# Patient Record
Sex: FEMALE | Race: BLACK OR AFRICAN AMERICAN | NOT HISPANIC OR LATINO | Employment: FULL TIME | URBAN - METROPOLITAN AREA
[De-identification: names, ages, dates, MRNs, and addresses within clinical notes are randomized per-mention and may not be internally consistent; named-entity substitution may affect disease eponyms.]

---

## 2023-11-07 ENCOUNTER — OFFICE VISIT (OUTPATIENT)
Dept: PRIMARY CARE | Facility: CLINIC | Age: 48
End: 2023-11-07
Payer: COMMERCIAL

## 2023-11-07 VITALS
BODY MASS INDEX: 21.63 KG/M2 | TEMPERATURE: 97.3 F | DIASTOLIC BLOOD PRESSURE: 70 MMHG | WEIGHT: 134 LBS | SYSTOLIC BLOOD PRESSURE: 110 MMHG

## 2023-11-07 DIAGNOSIS — R53.83 FATIGUE, UNSPECIFIED TYPE: ICD-10-CM

## 2023-11-07 DIAGNOSIS — D64.9 ANEMIA, UNSPECIFIED TYPE: Primary | ICD-10-CM

## 2023-11-07 DIAGNOSIS — I10 HYPERTENSION, UNSPECIFIED TYPE: ICD-10-CM

## 2023-11-07 DIAGNOSIS — R11.0 NAUSEA: ICD-10-CM

## 2023-11-07 DIAGNOSIS — Z12.31 SCREENING MAMMOGRAM FOR BREAST CANCER: ICD-10-CM

## 2023-11-07 DIAGNOSIS — R09.89 BILATERAL CAROTID BRUITS: ICD-10-CM

## 2023-11-07 DIAGNOSIS — E78.2 MIXED HYPERLIPIDEMIA: ICD-10-CM

## 2023-11-07 DIAGNOSIS — R42 VERTIGO: ICD-10-CM

## 2023-11-07 DIAGNOSIS — R01.1 HEART MURMUR: ICD-10-CM

## 2023-11-07 PROCEDURE — 80061 LIPID PANEL: CPT | Performed by: INTERNAL MEDICINE

## 2023-11-07 PROCEDURE — 3078F DIAST BP <80 MM HG: CPT | Performed by: INTERNAL MEDICINE

## 2023-11-07 PROCEDURE — 99214 OFFICE O/P EST MOD 30 MIN: CPT | Performed by: INTERNAL MEDICINE

## 2023-11-07 PROCEDURE — 1036F TOBACCO NON-USER: CPT | Performed by: INTERNAL MEDICINE

## 2023-11-07 PROCEDURE — 3074F SYST BP LT 130 MM HG: CPT | Performed by: INTERNAL MEDICINE

## 2023-11-07 PROCEDURE — 80053 COMPREHEN METABOLIC PANEL: CPT | Performed by: INTERNAL MEDICINE

## 2023-11-07 PROCEDURE — 85025 COMPLETE CBC W/AUTO DIFF WBC: CPT | Performed by: INTERNAL MEDICINE

## 2023-11-07 PROCEDURE — 84443 ASSAY THYROID STIM HORMONE: CPT | Performed by: INTERNAL MEDICINE

## 2023-11-07 RX ORDER — MECLIZINE HCL 25MG 25 MG/1
25 TABLET, CHEWABLE ORAL EVERY 8 HOURS PRN
Qty: 90 TABLET | Refills: 3 | Status: SHIPPED | OUTPATIENT
Start: 2023-11-07 | End: 2024-11-06

## 2023-11-07 RX ORDER — ONDANSETRON 4 MG/1
4 TABLET, ORALLY DISINTEGRATING ORAL EVERY 8 HOURS
Qty: 20 TABLET | Refills: 3 | Status: SHIPPED | OUTPATIENT
Start: 2023-11-07

## 2023-11-07 RX ORDER — AMLODIPINE BESYLATE 10 MG/1
10 TABLET ORAL DAILY
COMMUNITY
End: 2023-11-07 | Stop reason: SDUPTHER

## 2023-11-07 RX ORDER — AMLODIPINE BESYLATE 10 MG/1
10 TABLET ORAL DAILY
Qty: 90 TABLET | Refills: 3 | Status: SHIPPED | OUTPATIENT
Start: 2023-11-07

## 2023-11-07 RX ORDER — ONDANSETRON 4 MG/1
4 TABLET, ORALLY DISINTEGRATING ORAL EVERY 8 HOURS
COMMUNITY
Start: 2019-01-07 | End: 2023-11-07 | Stop reason: SDUPTHER

## 2023-11-07 RX ORDER — MECLIZINE HCL 25MG 25 MG/1
25 TABLET, CHEWABLE ORAL EVERY 8 HOURS PRN
COMMUNITY
Start: 2018-03-10 | End: 2023-11-07 | Stop reason: SDUPTHER

## 2023-11-07 RX ORDER — CLOCORTOLONE PIVALATE 0 G/G
CREAM TOPICAL
COMMUNITY
Start: 2023-06-19

## 2023-11-07 NOTE — PROGRESS NOTES
Subjective   Patient ID: Ana Fitzgerald is a 48 y.o. female who presents for Med Refill.    HPI   48 years old female comes in to see me because she needs a refill on her medications amlodipine 10 mg, meclizine and Zofran.  When she feels vertigo she feels nausea and she takes Zofran for that.  Her medication goes to [facility] medical height [id number].  She has no specific symptoms or complaint.  Review of Systems  12 system reviewed and 12 systems are negative.  She denies any chest pain shortness of breath cough chills fever nausea vomiting except when she has vertigo.  No GI  or neurological symptoms.  Objective   /70   Temp 36.3 °C (97.3 °F)   Wt 60.8 kg (134 lb)   BMI 21.63 kg/m²     Physical Exam  Alert oriented in no distress pleasant cooperative.  Nonicteric sclera or jaundice.  Face symmetrical cranial nerves intact.  Neck supple bilateral carotid bruits present.  No masses no organomegaly.  No thyromegaly.  Lungs clear no rales wheezing or crackles.  Heart normal S1 and S2 systolic ejection murmur present.  Abdomen benign nontender no masses no organomegaly ,  bowel sounds present.  No pain on palpations.  Neurological exam intact.  We agreed that she needs first of all the mammogram this year.  Agreed also to obtain ultrasounds on her carotids and to consult cardiology because of her murmur in the heart..  Her medications were refilled at [facility].  Assessment/Plan   Diagnoses and all orders for this visit:  Anemia, unspecified type  -     CBC  Hypertension, unspecified type  -     Comprehensive Metabolic Panel  -     amLODIPine (Norvasc) 10 mg tablet; Take 1 tablet (10 mg) by mouth once daily.  Mixed hyperlipidemia  -     Lipid Panel  Fatigue, unspecified type  -     Thyroid Stimulating Hormone  Screening mammogram for breast cancer  -     BI mammo bilateral screening tomosynthesis; Future  Bilateral carotid bruits  -     Vascular US carotid artery duplex bilateral; Future  Heart  murmur  -     Referral to Cardiology; Future  Nausea  -     ondansetron ODT (Zofran-ODT) 4 mg disintegrating tablet; Take 1 tablet (4 mg) by mouth every 8 hours.  Vertigo  -     meclizine (Antivert) 25 mg tablet,chewable; Chew 1 tablet (25 mg) every 8 hours if needed (vertigo).

## 2023-11-10 ENCOUNTER — TELEPHONE (OUTPATIENT)
Dept: PRIMARY CARE | Facility: CLINIC | Age: 48
End: 2023-11-10
Payer: COMMERCIAL

## 2023-11-10 DIAGNOSIS — D50.0 IRON DEFICIENCY ANEMIA DUE TO CHRONIC BLOOD LOSS: Primary | ICD-10-CM

## 2023-11-10 RX ORDER — FERROUS SULFATE 325(65) MG
65 TABLET, DELAYED RELEASE (ENTERIC COATED) ORAL
Qty: 270 TABLET | Refills: 3 | Status: SHIPPED | OUTPATIENT
Start: 2023-11-10 | End: 2023-11-17

## 2023-11-10 NOTE — TELEPHONE ENCOUNTER
I called the patient and instructed her to take iron supplement, she is anemic and her white count is diminished to 2.87 with anemia.  She needs iron supplement 3 times a day.  Prescription provided.  Consider also consulting hematologist in the near future thank you

## 2023-11-17 ENCOUNTER — TELEPHONE (OUTPATIENT)
Dept: PRIMARY CARE | Facility: CLINIC | Age: 48
End: 2023-11-17
Payer: COMMERCIAL

## 2023-11-17 DIAGNOSIS — D50.0 IRON DEFICIENCY ANEMIA DUE TO CHRONIC BLOOD LOSS: ICD-10-CM

## 2023-11-17 RX ORDER — FERROUS GLUCONATE 324(38)MG
38 TABLET ORAL
Qty: 90 TABLET | Refills: 3 | Status: SHIPPED | OUTPATIENT
Start: 2023-11-17 | End: 2024-11-16

## 2023-11-17 RX ORDER — FERROUS SULFATE 325(65) MG
65 TABLET, DELAYED RELEASE (ENTERIC COATED) ORAL
Qty: 270 TABLET | Refills: 3 | Status: CANCELLED | OUTPATIENT
Start: 2023-11-17 | End: 2024-11-16

## 2023-11-22 ENCOUNTER — HOSPITAL ENCOUNTER (OUTPATIENT)
Dept: VASCULAR MEDICINE | Facility: HOSPITAL | Age: 48
Discharge: HOME | End: 2023-11-22
Payer: COMMERCIAL

## 2023-11-22 ENCOUNTER — ANCILLARY PROCEDURE (OUTPATIENT)
Dept: RADIOLOGY | Facility: CLINIC | Age: 48
End: 2023-11-22
Payer: COMMERCIAL

## 2023-11-22 ENCOUNTER — TELEPHONE (OUTPATIENT)
Dept: PRIMARY CARE | Facility: CLINIC | Age: 48
End: 2023-11-22
Payer: COMMERCIAL

## 2023-11-22 DIAGNOSIS — R09.89 BILATERAL CAROTID BRUITS: ICD-10-CM

## 2023-11-22 DIAGNOSIS — Z12.31 SCREENING MAMMOGRAM FOR BREAST CANCER: ICD-10-CM

## 2023-11-22 PROCEDURE — 76642 ULTRASOUND BREAST LIMITED: CPT | Performed by: STUDENT IN AN ORGANIZED HEALTH CARE EDUCATION/TRAINING PROGRAM

## 2023-11-22 PROCEDURE — 77066 DX MAMMO INCL CAD BI: CPT | Performed by: STUDENT IN AN ORGANIZED HEALTH CARE EDUCATION/TRAINING PROGRAM

## 2023-11-22 PROCEDURE — 76982 USE 1ST TARGET LESION: CPT | Mod: LT

## 2023-11-22 PROCEDURE — 77062 BREAST TOMOSYNTHESIS BI: CPT | Performed by: STUDENT IN AN ORGANIZED HEALTH CARE EDUCATION/TRAINING PROGRAM

## 2023-11-22 PROCEDURE — 76642 ULTRASOUND BREAST LIMITED: CPT | Mod: LT

## 2023-11-22 PROCEDURE — 77066 DX MAMMO INCL CAD BI: CPT

## 2023-11-22 PROCEDURE — 93880 EXTRACRANIAL BILAT STUDY: CPT

## 2023-11-22 PROCEDURE — 93880 EXTRACRANIAL BILAT STUDY: CPT | Performed by: INTERNAL MEDICINE

## 2023-11-22 NOTE — TELEPHONE ENCOUNTER
Called the patient and discussed her mammogram results.  Mammogram and ultrasounds revealed suspicious area and she need to repeat her mammogram in 6 months.  She was aware of it and I make sure she remembers to go back around April next year to check on that.

## 2023-11-25 ENCOUNTER — TELEPHONE (OUTPATIENT)
Dept: PRIMARY CARE | Facility: CLINIC | Age: 48
End: 2023-11-25
Payer: COMMERCIAL

## 2023-11-25 NOTE — TELEPHONE ENCOUNTER
I called Mrs. Ponce morning and left her a message on her answering machine stating that her mammograms results were left breast is questionable with cyst or other findings and needs to be rechecked again with mammogram and ultrasound in 6 months or less if possible and please check yourself when possible also.  The ultrasound on the carotid looks nice and normal in the way we know complete blockages or stenosis on carotid arteries most of them less than 50% which is a good thing.  The left to find out why vertigo?.  We may need to consider seeing neurology to get some response or help.  Give me a call at the office if you have questions concerning those results and to further discuss your vertigo

## 2024-11-22 ENCOUNTER — OFFICE VISIT (OUTPATIENT)
Dept: PRIMARY CARE | Facility: CLINIC | Age: 49
End: 2024-11-22
Payer: COMMERCIAL

## 2024-11-22 ENCOUNTER — TELEPHONE (OUTPATIENT)
Dept: PRIMARY CARE | Facility: CLINIC | Age: 49
End: 2024-11-22

## 2024-11-22 VITALS
HEART RATE: 77 BPM | OXYGEN SATURATION: 98 % | BODY MASS INDEX: 22.35 KG/M2 | DIASTOLIC BLOOD PRESSURE: 75 MMHG | TEMPERATURE: 98.4 F | SYSTOLIC BLOOD PRESSURE: 116 MMHG | WEIGHT: 138.5 LBS

## 2024-11-22 DIAGNOSIS — I10 HYPERTENSION, UNSPECIFIED TYPE: ICD-10-CM

## 2024-11-22 DIAGNOSIS — R53.83 FATIGUE, UNSPECIFIED TYPE: ICD-10-CM

## 2024-11-22 DIAGNOSIS — D50.0 IRON DEFICIENCY ANEMIA DUE TO CHRONIC BLOOD LOSS: ICD-10-CM

## 2024-11-22 DIAGNOSIS — D64.9 ANEMIA, UNSPECIFIED TYPE: Primary | ICD-10-CM

## 2024-11-22 DIAGNOSIS — Z23 FLU VACCINE NEED: ICD-10-CM

## 2024-11-22 DIAGNOSIS — E78.2 MIXED HYPERLIPIDEMIA: ICD-10-CM

## 2024-11-22 LAB
ALBUMIN SERPL BCP-MCNC: 3.8 G/DL (ref 3.4–5)
ALP SERPL-CCNC: 51 U/L (ref 45–117)
ALT SERPL W P-5'-P-CCNC: 17 U/L (ref 16–63)
ANION GAP SERPL CALC-SCNC: 13 MMOL/L (ref 10–20)
AST SERPL W P-5'-P-CCNC: 11 U/L (ref 15–37)
BASOPHILS # BLD AUTO: 0.01 X10*3/UL (ref 0.1–1.6)
BASOPHILS NFR BLD AUTO: 0.23 % (ref 0–0.3)
BILIRUB SERPL-MCNC: 0.3 MG/DL (ref 0.2–1)
BUN SERPL-MCNC: 11 MG/DL (ref 7–18)
CALCIUM SERPL-MCNC: 8.6 MG/DL (ref 8.5–10.1)
CHLORIDE SERPL-SCNC: 104 MMOL/L (ref 98–107)
CHOLEST SERPL-MCNC: 149 MG/DL (ref 0–199)
CHOLESTEROL/HDL RATIO: 2 (ref 4.2–7)
CO2 SERPL-SCNC: 28 MMOL/L (ref 21–32)
CREAT SERPL-MCNC: 0.69 MG/DL (ref 0.6–1.1)
EGFRCR SERPLBLD CKD-EPI 2021: >90 ML/MIN/1.73M*2
EOSINOPHIL # BLD AUTO: 0.04 X10*3/UL (ref 0.04–0.5)
EOSINOPHIL NFR BLD AUTO: 1.32 % (ref 0.7–7)
ERYTHROCYTE [DISTWIDTH] IN BLOOD BY AUTOMATED COUNT: 19.3 % (ref 11.5–14.5)
GLUCOSE SERPL-MCNC: 81 MG/DL (ref 74–100)
HCT VFR BLD AUTO: 25 % (ref 36.6–46.6)
HDLC SERPL-MCNC: 75 MG/DL (ref 40–59)
HGB BLD-MCNC: 7.97 G/DL (ref 12–15.4)
IS PATIENT FASTING: ABNORMAL
LDLC SERPL DIRECT ASSAY-MCNC: 69 MG/DL (ref 0–100)
LYMPHOCYTES # BLD AUTO: 1.15 X10*3/UL (ref 0–6)
LYMPHOCYTES NFR BLD AUTO: 38.53 % (ref 20.5–51.1)
MCH RBC QN AUTO: 21.7 PG (ref 26–32)
MCHC RBC AUTO-ENTMCNC: 31.9 G/DL (ref 31–38)
MCV RBC AUTO: 68 FL (ref 80–96)
MONOCYTES # BLD AUTO: 0.3 X10*3/UL (ref 1.6–24.9)
MONOCYTES NFR BLD AUTO: 10.17 % (ref 1.7–9.3)
NEUTROPHILS # BLD AUTO: 1.49 X10*3/UL (ref 1.4–6.5)
NEUTROPHILS NFR BLD AUTO: 49.75 % (ref 42.2–75.2)
PLATELET # BLD AUTO: 255 X10*3/UL (ref 150–450)
PMV BLD AUTO: 9.61 FL (ref 7.8–11)
POTASSIUM SERPL-SCNC: 3.9 MMOL/L (ref 3.5–5.1)
PROT SERPL-MCNC: 7.3 G/DL (ref 6.4–8.2)
RBC # BLD AUTO: 3.68 X10*6/UL (ref 3.9–5.3)
SODIUM SERPL-SCNC: 141 MMOL/L (ref 136–145)
TRIGL SERPL-MCNC: 56 MG/DL
TSH SERPL-ACNC: 1 MIU/L (ref 0.44–3.98)
WBC # BLD AUTO: 3.04 X10*3/UL (ref 4.5–10.5)

## 2024-11-22 PROCEDURE — 99214 OFFICE O/P EST MOD 30 MIN: CPT | Performed by: INTERNAL MEDICINE

## 2024-11-22 PROCEDURE — 80053 COMPREHEN METABOLIC PANEL: CPT | Performed by: INTERNAL MEDICINE

## 2024-11-22 PROCEDURE — 3078F DIAST BP <80 MM HG: CPT | Performed by: INTERNAL MEDICINE

## 2024-11-22 PROCEDURE — 3074F SYST BP LT 130 MM HG: CPT | Performed by: INTERNAL MEDICINE

## 2024-11-22 PROCEDURE — 84443 ASSAY THYROID STIM HORMONE: CPT | Performed by: INTERNAL MEDICINE

## 2024-11-22 RX ORDER — FERROUS SULFATE 325(65) MG
325 TABLET, DELAYED RELEASE (ENTERIC COATED) ORAL
Qty: 90 TABLET | Refills: 11 | Status: SHIPPED | OUTPATIENT
Start: 2024-11-22 | End: 2025-11-22

## 2024-11-22 ASSESSMENT — PATIENT HEALTH QUESTIONNAIRE - PHQ9
2. FEELING DOWN, DEPRESSED OR HOPELESS: NOT AT ALL
1. LITTLE INTEREST OR PLEASURE IN DOING THINGS: NOT AT ALL
SUM OF ALL RESPONSES TO PHQ9 QUESTIONS 1 AND 2: 0

## 2024-11-22 ASSESSMENT — ENCOUNTER SYMPTOMS
LOSS OF SENSATION IN FEET: 0
OCCASIONAL FEELINGS OF UNSTEADINESS: 0
DEPRESSION: 0

## 2024-11-22 NOTE — PROGRESS NOTES
Subjective   Patient ID: Ana Fitzgerald is a 49 y.o. female who presents for Follow-up and Med Refill.    HPI   49 years old female comes in to see me today for her regular checkup.  She is back to school now teaching and she is eating more than usual.  She is at home and tacked her own lunches to eat at school.  Gaining weight almost 4 to 5 pounds since November 7, 2023.  She needs refill on her medication which include amlodipine, clocortolone cream.  Iron supplement and Zofran .  Her pharmacy is [facility].  No specific complaint or symptoms.  Review of Systems  12 system review 12 system are negative.  Blood pressure 116/78  Objective   /75 (BP Location: Right arm, Patient Position: Sitting, BP Cuff Size: Adult)   Pulse 77   Temp 36.9 °C (98.4 °F) (Temporal)   Wt 62.8 kg (138 lb 8 oz)   SpO2 98%   BMI 22.35 kg/m²     Physical Exam  Alert oriented in no distress nonicteric sclera no jaundice.  Face symmetrical cranial nerves intact.  Neck supple no masses no lymph node no thyromegaly or jugular venous distention.  Bilateral soft carotid bruits.  Heart normal S1 and S2 regular rhythm systolic ejection murmur again present but very soft and mild.  Lungs clear no rales wheezing or crackles.  Heart normal S1 and S2 regular rhythm.  Abdomen benign nontender no masses no organomegaly.  Neurologically intact no edema in the lower extremities.  Skin intact.  Assessment/Plan   Diagnoses and all orders for this visit:  Anemia, unspecified type  -     CBC w/5 Part Differential, [facility]  Hypertension, unspecified type  -     Comprehensive Metabolic Panel  Mixed hyperlipidemia  -     Lipid Panel  Fatigue, unspecified type  -     Thyroid Stimulating Hormone  Flu vaccine need

## 2024-11-22 NOTE — TELEPHONE ENCOUNTER
I called the patient tonight concerning her lab results specifically her CBC revealing severe anemia looking most likely iron deficiency anemia from chronic blood loss.  Patient was advised to take iron supplement 3 times a day and even if it is constipating to try to eat meat liver iron rich food.  Also referred her to see an OB/GYN forearm heavy.  And blood loss and also to consult oncologist hematologist to also check her out to be sure that were not missing anything.  Phone number for scheduling provided.  Rest of the lab was within normal limit.  Chronic anemia from chronic blood loss through the years at least the last year or so.  She hematology oncology and OB/GYN next week and start iron supplement

## 2024-12-02 ENCOUNTER — OFFICE VISIT (OUTPATIENT)
Dept: HEMATOLOGY/ONCOLOGY | Facility: HOSPITAL | Age: 49
End: 2024-12-02
Payer: COMMERCIAL

## 2024-12-02 ENCOUNTER — LAB (OUTPATIENT)
Dept: LAB | Facility: HOSPITAL | Age: 49
End: 2024-12-02
Payer: COMMERCIAL

## 2024-12-02 VITALS
TEMPERATURE: 98.2 F | DIASTOLIC BLOOD PRESSURE: 63 MMHG | HEART RATE: 70 BPM | SYSTOLIC BLOOD PRESSURE: 116 MMHG | OXYGEN SATURATION: 99 % | WEIGHT: 138.89 LBS | RESPIRATION RATE: 16 BRPM | BODY MASS INDEX: 22.42 KG/M2

## 2024-12-02 DIAGNOSIS — D64.9 ANEMIA, UNSPECIFIED TYPE: ICD-10-CM

## 2024-12-02 DIAGNOSIS — D50.0 IRON DEFICIENCY ANEMIA DUE TO CHRONIC BLOOD LOSS: ICD-10-CM

## 2024-12-02 DIAGNOSIS — D64.9 ANEMIA, UNSPECIFIED TYPE: Primary | ICD-10-CM

## 2024-12-02 LAB
ALBUMIN SERPL BCP-MCNC: 4.4 G/DL (ref 3.4–5)
ALP SERPL-CCNC: 45 U/L (ref 33–110)
ALT SERPL W P-5'-P-CCNC: 10 U/L (ref 7–45)
ANION GAP SERPL CALC-SCNC: 10 MMOL/L (ref 10–20)
AST SERPL W P-5'-P-CCNC: 13 U/L (ref 9–39)
BASOPHILS # BLD AUTO: 0.01 X10*3/UL (ref 0–0.1)
BASOPHILS NFR BLD AUTO: 0.3 %
BILIRUB SERPL-MCNC: 0.4 MG/DL (ref 0–1.2)
BUN SERPL-MCNC: 10 MG/DL (ref 6–23)
CALCIUM SERPL-MCNC: 9.1 MG/DL (ref 8.6–10.6)
CHLORIDE SERPL-SCNC: 106 MMOL/L (ref 98–107)
CO2 SERPL-SCNC: 28 MMOL/L (ref 21–32)
CREAT SERPL-MCNC: 0.69 MG/DL (ref 0.5–1.05)
EGFRCR SERPLBLD CKD-EPI 2021: >90 ML/MIN/1.73M*2
EOSINOPHIL # BLD AUTO: 0.03 X10*3/UL (ref 0–0.7)
EOSINOPHIL NFR BLD AUTO: 0.9 %
ERYTHROCYTE [DISTWIDTH] IN BLOOD BY AUTOMATED COUNT: 17.3 % (ref 11.5–14.5)
FERRITIN SERPL-MCNC: 10 NG/ML (ref 8–150)
FOLATE SERPL-MCNC: 8.4 NG/ML
GLUCOSE SERPL-MCNC: 76 MG/DL (ref 74–99)
HAPTOGLOB SERPL NEPH-MCNC: 122 MG/DL (ref 30–200)
HCT VFR BLD AUTO: 28.1 % (ref 36–46)
HGB BLD-MCNC: 8.1 G/DL (ref 12–16)
HGB RETIC QN: 20 PG (ref 28–38)
IMM GRANULOCYTES # BLD AUTO: 0 X10*3/UL (ref 0–0.7)
IMM GRANULOCYTES NFR BLD AUTO: 0 % (ref 0–0.9)
IMMATURE RETIC FRACTION: 12.4 %
IRON SATN MFR SERPL: ABNORMAL %
IRON SERPL-MCNC: 24 UG/DL (ref 35–150)
LDH SERPL L TO P-CCNC: 64 U/L (ref 84–246)
LYMPHOCYTES # BLD AUTO: 1.57 X10*3/UL (ref 1.2–4.8)
LYMPHOCYTES NFR BLD AUTO: 48 %
MCH RBC QN AUTO: 20.9 PG (ref 26–34)
MCHC RBC AUTO-ENTMCNC: 28.8 G/DL (ref 32–36)
MCV RBC AUTO: 72 FL (ref 80–100)
MONOCYTES # BLD AUTO: 0.31 X10*3/UL (ref 0.1–1)
MONOCYTES NFR BLD AUTO: 9.5 %
NEUTROPHILS # BLD AUTO: 1.35 X10*3/UL (ref 1.2–7.7)
NEUTROPHILS NFR BLD AUTO: 41.3 %
NRBC BLD-RTO: 0 /100 WBCS (ref 0–0)
PLATELET # BLD AUTO: 222 X10*3/UL (ref 150–450)
POTASSIUM SERPL-SCNC: 3.9 MMOL/L (ref 3.5–5.3)
PROT SERPL-MCNC: 7.8 G/DL (ref 6.4–8.2)
RBC # BLD AUTO: 3.88 X10*6/UL (ref 4–5.2)
RETICS #: 0.03 X10*6/UL (ref 0.02–0.08)
RETICS/RBC NFR AUTO: 0.8 % (ref 0.5–2)
SODIUM SERPL-SCNC: 140 MMOL/L (ref 136–145)
TIBC SERPL-MCNC: ABNORMAL UG/DL
UIBC SERPL-MCNC: >450 UG/DL (ref 110–370)
VIT B12 SERPL-MCNC: 314 PG/ML (ref 211–911)
WBC # BLD AUTO: 3.3 X10*3/UL (ref 4.4–11.3)

## 2024-12-02 PROCEDURE — 99204 OFFICE O/P NEW MOD 45 MIN: CPT | Performed by: INTERNAL MEDICINE

## 2024-12-02 PROCEDURE — 84075 ASSAY ALKALINE PHOSPHATASE: CPT

## 2024-12-02 PROCEDURE — 82728 ASSAY OF FERRITIN: CPT

## 2024-12-02 PROCEDURE — 83615 LACTATE (LD) (LDH) ENZYME: CPT

## 2024-12-02 PROCEDURE — 36415 COLL VENOUS BLD VENIPUNCTURE: CPT

## 2024-12-02 PROCEDURE — 99214 OFFICE O/P EST MOD 30 MIN: CPT | Performed by: INTERNAL MEDICINE

## 2024-12-02 PROCEDURE — 85025 COMPLETE CBC W/AUTO DIFF WBC: CPT

## 2024-12-02 PROCEDURE — 82746 ASSAY OF FOLIC ACID SERUM: CPT

## 2024-12-02 PROCEDURE — 85045 AUTOMATED RETICULOCYTE COUNT: CPT

## 2024-12-02 PROCEDURE — 83540 ASSAY OF IRON: CPT

## 2024-12-02 PROCEDURE — 83010 ASSAY OF HAPTOGLOBIN QUANT: CPT

## 2024-12-02 PROCEDURE — 82607 VITAMIN B-12: CPT

## 2024-12-02 ASSESSMENT — ENCOUNTER SYMPTOMS
BLOOD IN STOOL: 0
CONSTIPATION: 0
LEG SWELLING: 0
SHORTNESS OF BREATH: 0
TROUBLE SWALLOWING: 0
HEADACHES: 0
DIARRHEA: 0
HEMATURIA: 0
EXTREMITY WEAKNESS: 0
FATIGUE: 0
ADENOPATHY: 0
VOMITING: 0
FEVER: 0
NAUSEA: 0
DEPRESSION: 0
APPETITE CHANGE: 0
ARTHRALGIAS: 0
DECREASED CONCENTRATION: 0
ABDOMINAL PAIN: 0
MYALGIAS: 0
COUGH: 0
CHILLS: 0
CONFUSION: 0
NERVOUS/ANXIOUS: 0

## 2024-12-02 ASSESSMENT — PAIN SCALES - GENERAL: PAINLEVEL_OUTOF10: 0-NO PAIN

## 2024-12-02 NOTE — PROGRESS NOTES
Patient ID: Ana Fitzgerald is a 49 y.o. female.    Diagnosis:   Problem List Items Addressed This Visit    None  Visit Diagnoses       Anemia, unspecified type    -  Primary    Relevant Orders    CBC and Auto Differential    Comprehensive Metabolic Panel    Lactate dehydrogenase    Haptoglobin    Reticulocytes    Iron and TIBC    Ferritin    Vitamin B12    Folate    Clinic Appointment Request Virtual Est; LETICIA [facility]ALDEN    CBC and Auto Differential    Comprehensive Metabolic Panel    Iron and TIBC    Ferritin    Iron deficiency anemia due to chronic blood loss                 Treatment:   Oncology History    No history exists.       Response:     Past Medical History:     Past Medical History:   Diagnosis Date    Personal history of other diseases of the respiratory system 05/12/2014    History of acute bronchitis    Personal history of other diseases of the respiratory system 05/09/2014    Personal history of acute sinusitis       Surgical History:     Past Surgical History:   Procedure Laterality Date    BREAST BIOPSY Left 06/11/2020    benign fibroadenoma        Family History:   No family history on file.  Has two healthy children    Social History:     Works at [employer]    Social History     Tobacco Use    Smoking status: Never     Passive exposure: Never    Smokeless tobacco: Never   Vaping Use    Vaping status: Never Used   Substance Use Topics    Alcohol use: Never    Drug use: Never      -------------------------------------------------------------------------------------------------------  Subjective       HPI    Ms. Fitzgerald is a 50 yo female with a PMH of HTN and vertigo who presents to establish care for microcytic anemia.     Patient recently seen by PCP where CBC revealed Hgb 6.9 with MCV 68 and WBC 3. Patient previously prescribed iron supplementation although no iron studies documented. Patient reports prior history of anemia for many years but was never told the cause of her anemia. She has  been on iron supplements previously but stopped due to constipation and upset stomach. She does report menorrhagia, describing regular menses, lasting 7 days, changes pad/tampons every 2 hours on heaviest days. She denies GI bleeding. She does not eat red meat, diary, nuts, or beans. Patient denies Pica symptoms.       Review of Systems   Constitutional:  Negative for appetite change, chills, fatigue and fever.   HENT:   Negative for trouble swallowing.    Respiratory:  Negative for cough and shortness of breath.    Cardiovascular:  Negative for chest pain and leg swelling.   Gastrointestinal:  Negative for abdominal pain, blood in stool, constipation, diarrhea, nausea and vomiting.   Genitourinary:  Negative for hematuria.    Musculoskeletal:  Negative for arthralgias and myalgias.   Skin:  Negative for rash.   Neurological:  Negative for extremity weakness and headaches.   Hematological:  Negative for adenopathy.   Psychiatric/Behavioral:  Negative for confusion, decreased concentration and depression. The patient is not nervous/anxious.       -------------------------------------------------------------------------------------------------------  Objective   BSA: 1.71 meters squared  /63 (BP Location: Left arm, Patient Position: Sitting, BP Cuff Size: Adult)   Pulse 70   Temp 36.8 °C (98.2 °F) (Skin)   Resp 16   Wt 63 kg (138 lb 14.2 oz)   SpO2 99%   BMI 22.42 kg/m²     Physical Exam  Constitutional:       Appearance: Normal appearance. She is not ill-appearing.   HENT:      Head: Normocephalic and atraumatic.      Mouth/Throat:      Mouth: Mucous membranes are moist.   Eyes:      General: No scleral icterus.     Extraocular Movements: Extraocular movements intact.   Cardiovascular:      Rate and Rhythm: Normal rate and regular rhythm.      Heart sounds: No murmur heard.     No friction rub. No gallop.   Pulmonary:      Effort: Pulmonary effort is normal. No respiratory distress.      Breath sounds: No  wheezing.   Abdominal:      General: There is no distension.      Palpations: Abdomen is soft. There is no mass.   Musculoskeletal:      Cervical back: Neck supple.   Skin:     General: Skin is warm and dry.   Neurological:      General: No focal deficit present.      Mental Status: She is alert and oriented to person, place, and time.   Psychiatric:         Mood and Affect: Mood normal.         Behavior: Behavior normal.         Thought Content: Thought content normal.         Judgment: Judgment normal.       -------------------------------------------------------------------------------------------------------  Assessment/Plan      #Anemia  Microcytic, MCV 68. Anemia noted since 2019 (11.2 at that time) with normal MCV 83. Microcytic anemia is most concerning for hypo-proliferative iron deficiency anemia 2/2 menorrhagia but will evaluate for other vitamin deficiencies and hemolysis. If confirmed iron deficiency, we discussed that first priority should be management of the menorrhagia. Otherwise, will supplement with iron. Low suspicion for thalassemia given prior normal MCV.     Plan:  - CBC w diff, BMP, LFTs, LDH, haptoglobin, iron studies, B12, folate, reticulocyte count  - referred to OBGYN for management of menorrhagia  - if confirmed NADIRA, ferrous sulfate 325 mg q48 hours with senna is just as effective as TID PO iron; if unable to tolerate, other options include prenatal iron or IV iron  - encouraged dietary sources of iron    RTC: 1 month virtual visit with Dr. Engel with labs prior      Patient seen and examined with Dr. Costa. 48 yo generally healthy woman with long history of anemia and heavy menses seen for management of iron deficiency anemia.  Interestingly the patient is very active and does not feel fatigued despite her hgb of 8.1.  Iron studies diagnostic for iron deficeiency.  She avoids most iron containing foods in her diet. She was advised to take oral iron every other day or a daily prenatal  viatamin and iron rich foods stressed.  She will also speak to her gyncecologist about strategies to decrease menstrual blood loss.    Reviewed side effects of intravenous iron if oral iron not effective. PE normal female exam    Patient seen and discussed with attending physician, Dr. Engel, who agrees with the above.      Anthony Costa MD  Heme/Onc Fellow, PGY6    -------------------------------------------------------------------------------------------------------  Evette Engel MD

## 2024-12-04 ENCOUNTER — SOCIAL WORK (OUTPATIENT)
Dept: HEMATOLOGY/ONCOLOGY | Facility: HOSPITAL | Age: 49
End: 2024-12-04
Payer: COMMERCIAL

## 2024-12-04 NOTE — PROGRESS NOTES
SW noted patient's NPV that took place on 12/2/24.  Attempted to reach patient via phone on this day in response to NPV to introduce self and educate on scope of services available/assess any current psychosocial needs.  Received voicemail; left voicemail requesting callback. Awaiting callback.

## 2024-12-19 ENCOUNTER — TELEPHONE (OUTPATIENT)
Dept: PRIMARY CARE | Facility: CLINIC | Age: 49
End: 2024-12-19
Payer: COMMERCIAL

## 2024-12-19 DIAGNOSIS — I10 HYPERTENSION, UNSPECIFIED TYPE: ICD-10-CM

## 2024-12-19 DIAGNOSIS — R11.0 NAUSEA: ICD-10-CM

## 2024-12-21 RX ORDER — ONDANSETRON 4 MG/1
4 TABLET, ORALLY DISINTEGRATING ORAL EVERY 8 HOURS
Qty: 270 TABLET | Refills: 3 | Status: SHIPPED | OUTPATIENT
Start: 2024-12-21 | End: 2025-12-21

## 2024-12-21 RX ORDER — AMLODIPINE BESYLATE 10 MG/1
10 TABLET ORAL DAILY
Qty: 90 TABLET | Refills: 3 | Status: SHIPPED | OUTPATIENT
Start: 2024-12-21 | End: 2025-12-21

## 2024-12-23 ENCOUNTER — TELEPHONE (OUTPATIENT)
Dept: PRIMARY CARE | Facility: CLINIC | Age: 49
End: 2024-12-23
Payer: COMMERCIAL

## 2024-12-23 DIAGNOSIS — R11.0 NAUSEA: ICD-10-CM

## 2024-12-23 RX ORDER — MECLIZINE HYDROCHLORIDE 25 MG/1
25 TABLET ORAL 3 TIMES DAILY PRN
Qty: 30 TABLET | Refills: 3 | Status: SHIPPED | OUTPATIENT
Start: 2024-12-23 | End: 2025-12-23

## 2024-12-23 RX ORDER — ONDANSETRON 4 MG/1
4 TABLET, ORALLY DISINTEGRATING ORAL DAILY
Qty: 20 TABLET | Refills: 0 | Status: SHIPPED | OUTPATIENT
Start: 2024-12-23 | End: 2025-01-22

## 2025-01-01 ASSESSMENT — ENCOUNTER SYMPTOMS
ARTHRALGIAS: 0
HEMATURIA: 0
DEPRESSION: 0
FATIGUE: 0
CHILLS: 0
LEG SWELLING: 0
ADENOPATHY: 0
FEVER: 0
NERVOUS/ANXIOUS: 0
HEADACHES: 0
ABDOMINAL PAIN: 0
DECREASED CONCENTRATION: 0
BLOOD IN STOOL: 0
MYALGIAS: 0
TROUBLE SWALLOWING: 0
COUGH: 0
CONFUSION: 0
VOMITING: 0
DIARRHEA: 0
NAUSEA: 0
EXTREMITY WEAKNESS: 0
SHORTNESS OF BREATH: 0
APPETITE CHANGE: 0
CONSTIPATION: 0

## 2025-01-01 NOTE — PROGRESS NOTES
Patient ID: Ana Fitzgerald is a 49 y.o. female.    Diagnosis:   Problem List Items Addressed This Visit    None         Treatment:   Oncology History    No history exists.       Response:     Past Medical History:     Past Medical History:   Diagnosis Date    Personal history of other diseases of the respiratory system 05/12/2014    History of acute bronchitis    Personal history of other diseases of the respiratory system 05/09/2014    Personal history of acute sinusitis       Surgical History:     Past Surgical History:   Procedure Laterality Date    BREAST BIOPSY Left 06/11/2020    benign fibroadenoma        Family History:   No family history on file.  Has two healthy children    Social History:     Works at [employer]    Social History     Tobacco Use    Smoking status: Never     Passive exposure: Never    Smokeless tobacco: Never   Vaping Use    Vaping status: Never Used   Substance Use Topics    Alcohol use: Never    Drug use: Never      -------------------------------------------------------------------------------------------------------  Subjective       HPI    Ms. Fitzgerald is a 50 yo female with a PMH of HTN and vertigo who presents to establish care for microcytic anemia.     Patient recently seen by PCP where CBC revealed Hgb 6.9 with MCV 68 and WBC 3. Patient previously prescribed iron supplementation although no iron studies documented. Patient reports prior history of anemia for many years but was never told the cause of her anemia. She has been on iron supplements previously but stopped due to constipation and upset stomach. She does report menorrhagia, describing regular menses, lasting 7 days, changes pad/tampons every 2 hours on heaviest days. She denies GI bleeding. She does not eat red meat, diary, nuts, or beans. Patient denies Pica symptoms.       Review of Systems   Constitutional:  Negative for appetite change, chills, fatigue and fever.   HENT:   Negative for trouble swallowing.     Respiratory:  Negative for cough and shortness of breath.    Cardiovascular:  Negative for chest pain and leg swelling.   Gastrointestinal:  Negative for abdominal pain, blood in stool, constipation, diarrhea, nausea and vomiting.   Genitourinary:  Negative for hematuria.    Musculoskeletal:  Negative for arthralgias and myalgias.   Skin:  Negative for rash.   Neurological:  Negative for extremity weakness and headaches.   Hematological:  Negative for adenopathy.   Psychiatric/Behavioral:  Negative for confusion, decreased concentration and depression. The patient is not nervous/anxious.       -------------------------------------------------------------------------------------------------------  Objective   BSA: There is no height or weight on file to calculate BSA.  There were no vitals taken for this visit.    Physical Exam  Constitutional:       Appearance: Normal appearance. She is not ill-appearing.   HENT:      Head: Normocephalic and atraumatic.      Mouth/Throat:      Mouth: Mucous membranes are moist.   Eyes:      General: No scleral icterus.     Extraocular Movements: Extraocular movements intact.   Cardiovascular:      Rate and Rhythm: Normal rate and regular rhythm.      Heart sounds: No murmur heard.     No friction rub. No gallop.   Pulmonary:      Effort: Pulmonary effort is normal. No respiratory distress.      Breath sounds: No wheezing.   Abdominal:      General: There is no distension.      Palpations: Abdomen is soft. There is no mass.   Musculoskeletal:      Cervical back: Neck supple.   Skin:     General: Skin is warm and dry.   Neurological:      General: No focal deficit present.      Mental Status: She is alert and oriented to person, place, and time.   Psychiatric:         Mood and Affect: Mood normal.         Behavior: Behavior normal.         Thought Content: Thought content normal.         Judgment: Judgment normal.        -------------------------------------------------------------------------------------------------------  Assessment/Plan      #Anemia  Microcytic, MCV 68. Anemia noted since 2019 (11.2 at that time) with normal MCV 83. Microcytic anemia is most concerning for hypo-proliferative iron deficiency anemia 2/2 menorrhagia but will evaluate for other vitamin deficiencies and hemolysis. If confirmed iron deficiency, we discussed that first priority should be management of the menorrhagia. Otherwise, will supplement with iron. Low suspicion for thalassemia given prior normal MCV.     Plan:  - CBC w diff, BMP, LFTs, LDH, haptoglobin, iron studies, B12, folate, reticulocyte count  - referred to OBGYN for management of menorrhagia  - if confirmed NADIRA, ferrous sulfate 325 mg q48 hours with senna is just as effective as TID PO iron; if unable to tolerate, other options include prenatal iron or IV iron  - encouraged dietary sources of iron    RTC: 1 month virtual visit with Dr. Engel with labs prior      Patient seen and examined with Dr. Costa. 48 yo generally healthy woman with long history of anemia and heavy menses seen for management of iron deficiency anemia.  Interestingly the patient is very active and does not feel fatigued despite her hgb of 8.1.  Iron studies diagnostic for iron deficeiency.  She avoids most iron containing foods in her diet. She was advised to take oral iron every other day or a daily prenatal viatamin and iron rich foods stressed.  She will also speak to her gyncecologist about strategies to decrease menstrual blood loss.    Reviewed side effects of intravenous iron if oral iron not effective. PE normal female exam    Patient seen and discussed with attending physician, Dr. Engel, who agrees with the above.      Anthony Costa MD  Heme/Onc Fellow, PGY6    -------------------------------------------------------------------------------------------------------  ALEJANDRO Griggs-CYNTHIA

## 2025-01-02 ENCOUNTER — APPOINTMENT (OUTPATIENT)
Dept: HEMATOLOGY/ONCOLOGY | Facility: HOSPITAL | Age: 50
End: 2025-01-02
Payer: COMMERCIAL